# Patient Record
Sex: MALE | Race: WHITE | NOT HISPANIC OR LATINO | Employment: FULL TIME | URBAN - METROPOLITAN AREA
[De-identification: names, ages, dates, MRNs, and addresses within clinical notes are randomized per-mention and may not be internally consistent; named-entity substitution may affect disease eponyms.]

---

## 2021-07-10 ENCOUNTER — HOSPITAL ENCOUNTER (EMERGENCY)
Facility: HOSPITAL | Age: 26
Discharge: HOME | End: 2021-07-10
Admitting: EMERGENCY MEDICINE

## 2021-07-10 VITALS
HEART RATE: 92 BPM | RESPIRATION RATE: 17 BRPM | BODY MASS INDEX: 29.06 KG/M2 | SYSTOLIC BLOOD PRESSURE: 134 MMHG | TEMPERATURE: 97.5 F | WEIGHT: 233.69 LBS | OXYGEN SATURATION: 96 % | DIASTOLIC BLOOD PRESSURE: 90 MMHG | HEIGHT: 75 IN

## 2021-07-10 DIAGNOSIS — K64.5 THROMBOSED EXTERNAL HEMORRHOID: Primary | ICD-10-CM

## 2021-07-10 PROCEDURE — 99282 EMERGENCY DEPT VISIT SF MDM: CPT

## 2021-07-10 RX ORDER — CEPHALEXIN 500 MG/1
500 CAPSULE ORAL 3 TIMES DAILY
Qty: 21 CAPSULE | Refills: 0 | Status: SHIPPED | OUTPATIENT
Start: 2021-07-10 | End: 2021-07-17

## 2021-07-10 RX ORDER — PRAMOXINE HYDROCHLORIDE 10 MG/G
AEROSOL, FOAM TOPICAL
Qty: 15 G | Refills: 0 | Status: SHIPPED | OUTPATIENT
Start: 2021-07-10

## 2021-07-10 NOTE — DISCHARGE INSTRUCTIONS
Take medications as prescribed.  Warm sits baths.  Colace.  Avoid prolonged standing sitting straining heavy lifting.  Follow-up with general surgery.  Return for new or worsening symptoms.

## 2021-07-10 NOTE — ED PROVIDER NOTES
Subjective   Patient is a 25-year-old white male with no significant medical history who presents today with complaints of pain and bleeding to the rectal area.  He states he was recently told he had hemorrhoid.  He was prescribed topical hydrocortisone.  He states over the last 24 hours after having a bowel movement he has developed bleeding from the area.  He states his pain has somewhat improved but he continues to have some discomfort and bleeding with bowel movements.  States he has had issues with constipation in the past.  States he scheduled to see general surgery on the 28th but was concerned due to the amount of bleeding he had this morning.  Denies any dizziness lightheadedness syncope chest pain shortness of breath fever or other complaint.          Review of Systems   Constitutional: Negative for chills and fever.   Respiratory: Negative for shortness of breath.    Gastrointestinal: Positive for anal bleeding and constipation. Negative for abdominal pain and vomiting.   Neurological: Negative for dizziness, syncope and light-headedness.       Past Medical History:   Diagnosis Date   • Hemorrhoid 2020       No Known Allergies    No past surgical history on file.    No family history on file.    Social History     Socioeconomic History   • Marital status:      Spouse name: Not on file   • Number of children: Not on file   • Years of education: Not on file   • Highest education level: Not on file   Tobacco Use   • Smoking status: Never Smoker   • Smokeless tobacco: Never Used   Vaping Use   • Vaping Use: Never used   Substance and Sexual Activity   • Alcohol use: Never   • Drug use: Defer   • Sexual activity: Defer           Objective   Physical Exam  Vital signs and triage nurse note reviewed.  Constitutional: Awake, alert; well-developed and well-nourished. No acute distress is noted.  Cardiovascular: Regular rate and rhythm  Pulmonary: Respiratory effort regular nonlabored  Abdomen: Soft,  nontender, nondistended with normoactive bowel sounds; no rebound or guarding.  Rectal exam reveals single external thrombosed hemorrhoid with some dried blood noted at the anus.  No active bleeding noted.  There is some mild tenderness to this area.  There is no surrounding erythema induration or fluctuance.  Musculoskeletal: Independent range of motion of all extremities with no palpable tenderness or edema.  Neuro: Alert oriented x3, speech is clear and appropriate, GCS 15.    Skin: Flesh tone, warm, dry, intact; no erythematous or petechial rash or lesion.      Incision & Drainage    Date/Time: 7/10/2021 12:59 PM  Performed by: Annamaria Henson APRN  Authorized by: Annamaria Henson APRN     Consent:     Consent obtained:  Verbal    Consent given by:  Patient    Risks discussed:  Bleeding, incomplete drainage, pain and infection  Location:     Type:  External thrombosed hemorrhoid    Location:  Anogenital    Anogenital location:  Perianal  Pre-procedure details:     Skin preparation:  Betadine  Anesthesia (see MAR for exact dosages):     Anesthesia method:  Local infiltration    Local anesthetic:  Lidocaine 1% WITH epi  Procedure type:     Complexity:  Simple  Procedure details:     Incision types:  Single straight    Scalpel blade:  11    Drainage:  Bloody    Drainage amount:  Scant    Packing materials:  None  Post-procedure details:     Patient tolerance of procedure:  Tolerated well, no immediate complications               ED Course                                           MDM  Number of Diagnoses or Management Options  Thrombosed external hemorrhoid  Diagnosis management comments: Patient had thrombosed external hemorrhoid excised as noted above.  He remained well-appearing throughout his ED stay in no distress and has stable vital signs.    Diagnosis and treatment plan discussed with patient.  Patient agreeable to plan.   I discussed findings with patient who voices understanding of discharge instructions,  signs and symptoms requiring return to ED; discharged improved and in stable condition with follow up for re-evaluation.  Prescription for Proctofoam and Keflex.    Patient Progress  Patient progress: stable      Final diagnoses:   Thrombosed external hemorrhoid       ED Disposition  ED Disposition     ED Disposition Condition Comment    Discharge Stable           oG López,   [address] [place] [address] [phone]    Schedule an appointment as soon as possible for a visit            Medication List      New Prescriptions    cephalexin 500 MG capsule  Commonly known as: KEFLEX  Take 1 capsule by mouth 3 (Three) Times a Day for 7 days.     Pramoxine HCl (Perianal) 1 % foam  Commonly known as: Proctofoam  Insert  into the rectum Every 2 (Two) Hours As Needed for Hemorrhoids.        Stop    Hydrocortisone (Perianal) 2.5 % rectal cream  Commonly known as: ANUSOL-HC           Where to Get Your Medications      These medications were sent to [facility]/pharmacy #[id number] - [facility] [place] - [address] [place] [address] AT [address] [phone] [place] [phone] [address] [place] [address]    Phone: [phone]   · cephalexin 500 MG capsule  · Pramoxine HCl (Perianal) 1 % foam          Annamaria Henson APRN  07/10/21 0000

## 2021-07-28 ENCOUNTER — OFFICE VISIT (OUTPATIENT)
Dept: SURGERY | Facility: CLINIC | Age: 26
End: 2021-07-28

## 2021-07-28 VITALS
OXYGEN SATURATION: 96 % | BODY MASS INDEX: 27.63 KG/M2 | HEART RATE: 81 BPM | SYSTOLIC BLOOD PRESSURE: 134 MMHG | DIASTOLIC BLOOD PRESSURE: 96 MMHG | TEMPERATURE: 98.4 F | WEIGHT: 234 LBS | HEIGHT: 77 IN

## 2021-07-28 DIAGNOSIS — K64.8 HEMORRHOIDS WITH COMPLICATION: Primary | ICD-10-CM

## 2021-07-28 PROCEDURE — 99202 OFFICE O/P NEW SF 15 MIN: CPT | Performed by: SURGERY

## 2021-07-28 RX ORDER — LORATADINE 10 MG/1
CAPSULE, LIQUID FILLED ORAL
COMMUNITY

## 2021-07-28 NOTE — PROGRESS NOTES
Subjective   Bang Ferguson is a 26 y.o. male.     History of present illness  Bang is a pleasant 26-year-old male seen in the office today for hemorrhoid disease.  He had this appointment scheduled sometime ago for ongoing problems and then developed what sounds like a thrombosed external hemorrhoid.  He presented to the emergency department and they did an I&D and as expected his pain rapidly went away.  The suggested that he keep his appointment just so we could check him out.    He has had no further bleeding since the I&D and bowels are moving well.    Past Medical History:   Diagnosis Date   • Hemorrhoid 2020   • Hemorrhoids        Past Surgical History:   Procedure Laterality Date   • HEMORRHOIDECTOMY         Outpatient Encounter Medications as of 7/28/2021   Medication Sig Dispense Refill   • Loratadine (Claritin) 10 MG capsule Take  by mouth.     • Pramoxine HCl, Perianal, (Proctofoam) 1 % foam Insert  into the rectum Every 2 (Two) Hours As Needed for Hemorrhoids. 15 g 0     No facility-administered encounter medications on file as of 7/28/2021.       No Known Allergies    History reviewed. No pertinent family history.    Social History     Socioeconomic History   • Marital status:      Spouse name: Not on file   • Number of children: Not on file   • Years of education: Not on file   • Highest education level: Not on file   Tobacco Use   • Smoking status: Never Smoker   • Smokeless tobacco: Never Used   Vaping Use   • Vaping Use: Never used   Substance and Sexual Activity   • Alcohol use: Never   • Drug use: Defer   • Sexual activity: Defer       The following portions of the patient's history were reviewed and updated as appropriate: allergies, current medications, past family history, past medical history, past social history, past surgical history and problem list.    Objective   Complete review of systems is done and unremarkable exception the chief complaint.    Exam is limited the  system at hand.  Rectal exam shows no external disease presently he is healed his thrombosed area nicely.  No prolapse with Valsalva maneuvers.    Impression: 26-year-old with history of thrombosed external hemorrhoid now treated.    Recommendation: No need for surgical intervention at this point.  Recheck in the office as needed    Assessment/Plan   There are no diagnoses linked to this encounter.               Go López DO  7/28/2021  15:47 EDT